# Patient Record
Sex: FEMALE | Race: WHITE
[De-identification: names, ages, dates, MRNs, and addresses within clinical notes are randomized per-mention and may not be internally consistent; named-entity substitution may affect disease eponyms.]

---

## 2017-03-29 ENCOUNTER — HOSPITAL ENCOUNTER (OUTPATIENT)
Dept: HOSPITAL 53 - M ADAMS | Age: 62
End: 2017-03-29
Attending: PHYSICIAN ASSISTANT
Payer: COMMERCIAL
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
## 2017-03-29 DIAGNOSIS — S20.222A: Primary | ICD-10-CM
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
## 2017-03-29 NOTE — REP
Clinical:  Trauma.

 

Technique:  Frontal view of the chest with multiple views of the left

hemithorax.

 

Findings:

Frontal view of the chest demonstrates no acute cardiopulmonary process.

Multiple views of the left hemithorax demonstrates no obvious acute rib fracture

or pathology.

 

Impression:

Normal left rib series. No obvious left rib fracture.

 

 

Signed by

Marcio Valles MD 03/29/2017 06:31 P

## 2019-07-25 ENCOUNTER — HOSPITAL ENCOUNTER (OUTPATIENT)
Dept: HOSPITAL 53 - M LAB REF | Age: 64
End: 2019-07-25
Attending: INTERNAL MEDICINE
Payer: COMMERCIAL

## 2019-07-25 DIAGNOSIS — Q85.00: ICD-10-CM

## 2019-07-25 DIAGNOSIS — K90.0: Primary | ICD-10-CM

## 2019-07-25 DIAGNOSIS — K29.70: ICD-10-CM

## 2019-07-25 LAB
CRP SERPL-MCNC: < 0.3 MG/DL (ref 0–0.3)
FERRITIN SERPL-MCNC: 118 NG/ML (ref 8–252)
VIT B12 SERPL-MCNC: 942 PG/ML (ref 247–911)

## 2020-07-28 ENCOUNTER — HOSPITAL ENCOUNTER (OUTPATIENT)
Dept: HOSPITAL 53 - M LAB REF | Age: 65
End: 2020-07-28
Attending: INTERNAL MEDICINE
Payer: MEDICARE

## 2020-07-28 DIAGNOSIS — Z11.59: Primary | ICD-10-CM

## 2021-01-11 ENCOUNTER — HOSPITAL ENCOUNTER (OUTPATIENT)
Dept: HOSPITAL 53 - M LABSMTC | Age: 66
End: 2021-01-11
Attending: PEDIATRICS
Payer: SELF-PAY

## 2021-01-11 DIAGNOSIS — Z20.822: Primary | ICD-10-CM

## 2021-06-28 ENCOUNTER — HOSPITAL ENCOUNTER (OUTPATIENT)
Dept: HOSPITAL 53 - M LAB REF | Age: 66
End: 2021-06-28
Attending: PHYSICIAN ASSISTANT
Payer: MEDICARE

## 2021-06-28 DIAGNOSIS — D36.17: Primary | ICD-10-CM

## 2021-06-28 PROCEDURE — 11102 TANGNTL BX SKIN SINGLE LES: CPT

## 2021-06-28 PROCEDURE — 11103 TANGNTL BX SKIN EA SEP/ADDL: CPT

## 2021-06-28 PROCEDURE — 88305 TISSUE EXAM BY PATHOLOGIST: CPT

## 2021-08-03 ENCOUNTER — HOSPITAL ENCOUNTER (OUTPATIENT)
Dept: HOSPITAL 53 - M LAB REF | Age: 66
End: 2021-08-03
Attending: INTERNAL MEDICINE
Payer: MEDICARE

## 2021-08-03 DIAGNOSIS — Z01.89: Primary | ICD-10-CM

## 2021-09-03 ENCOUNTER — HOSPITAL ENCOUNTER (OUTPATIENT)
Dept: HOSPITAL 53 - M LAB REF | Age: 66
End: 2021-09-03
Attending: DERMATOLOGY
Payer: MEDICARE

## 2021-09-03 DIAGNOSIS — D23.62: Primary | ICD-10-CM

## 2022-08-04 ENCOUNTER — HOSPITAL ENCOUNTER (OUTPATIENT)
Dept: HOSPITAL 53 - M LAB REF | Age: 67
End: 2022-08-04
Attending: INTERNAL MEDICINE
Payer: MEDICARE

## 2022-08-04 DIAGNOSIS — Z11.59: Primary | ICD-10-CM

## 2022-08-23 ENCOUNTER — HOSPITAL ENCOUNTER (OUTPATIENT)
Dept: HOSPITAL 53 - M SFHCDERM | Age: 67
End: 2022-08-23
Attending: PHYSICIAN ASSISTANT
Payer: MEDICARE

## 2022-08-23 DIAGNOSIS — D36.12: Primary | ICD-10-CM

## 2023-02-06 ENCOUNTER — HOSPITAL ENCOUNTER (EMERGENCY)
Dept: HOSPITAL 53 - M ED | Age: 68
Discharge: HOME | End: 2023-02-06
Payer: MEDICARE

## 2023-02-06 VITALS — SYSTOLIC BLOOD PRESSURE: 129 MMHG | DIASTOLIC BLOOD PRESSURE: 71 MMHG

## 2023-02-06 DIAGNOSIS — S43.014A: Primary | ICD-10-CM

## 2023-02-06 DIAGNOSIS — Z88.0: ICD-10-CM

## 2023-02-06 DIAGNOSIS — Z91.013: ICD-10-CM
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
## 2023-02-06 DIAGNOSIS — Z88.7: ICD-10-CM

## 2023-02-06 DIAGNOSIS — Z88.6: ICD-10-CM

## 2023-02-06 DIAGNOSIS — Z79.891: ICD-10-CM

## 2023-02-06 PROCEDURE — 94760 N-INVAS EAR/PLS OXIMETRY 1: CPT

## 2023-02-06 PROCEDURE — 23655 CLTX SHO DSLC W/MNPJ W/ANES: CPT

## 2023-02-06 PROCEDURE — 99285 EMERGENCY DEPT VISIT HI MDM: CPT

## 2023-02-06 PROCEDURE — 73000 X-RAY EXAM OF COLLAR BONE: CPT

## 2023-02-06 PROCEDURE — 96374 THER/PROPH/DIAG INJ IV PUSH: CPT

## 2023-02-06 PROCEDURE — 73030 X-RAY EXAM OF SHOULDER: CPT

## 2023-02-06 PROCEDURE — 93041 RHYTHM ECG TRACING: CPT

## 2023-02-06 PROCEDURE — 73020 X-RAY EXAM OF SHOULDER: CPT

## 2023-04-28 ENCOUNTER — HOSPITAL ENCOUNTER (OUTPATIENT)
Dept: HOSPITAL 53 - M RAD | Age: 68
End: 2023-04-28
Attending: PHYSICIAN ASSISTANT
Payer: MEDICARE

## 2023-04-28 DIAGNOSIS — M19.011: ICD-10-CM

## 2023-04-28 DIAGNOSIS — S43.014D: Primary | ICD-10-CM

## 2023-04-28 DIAGNOSIS — M79.89: ICD-10-CM

## 2023-04-28 DIAGNOSIS — M89.8X2: ICD-10-CM

## 2023-04-28 DIAGNOSIS — M75.101: ICD-10-CM

## 2023-04-28 DIAGNOSIS — M25.411: ICD-10-CM

## 2023-08-15 ENCOUNTER — HOSPITAL ENCOUNTER (OUTPATIENT)
Dept: HOSPITAL 53 - M LAB REF | Age: 68
End: 2023-08-15
Attending: INTERNAL MEDICINE
Payer: MEDICARE

## 2023-08-15 DIAGNOSIS — M25.50: Primary | ICD-10-CM

## 2023-08-15 LAB
CRP SERPL-MCNC: < 0.4 MG/DL (ref ?–1)
FERRITIN SERPL-MCNC: 115.9 NG/ML (ref 7.3–270.7)
FOLATE SERPL-MCNC: > 24 NG/ML (ref 5.4–?)
VIT B12 SERPL-MCNC: 1029 PG/ML (ref 211–911)